# Patient Record
Sex: FEMALE | Race: WHITE | NOT HISPANIC OR LATINO | Employment: FULL TIME | URBAN - METROPOLITAN AREA
[De-identification: names, ages, dates, MRNs, and addresses within clinical notes are randomized per-mention and may not be internally consistent; named-entity substitution may affect disease eponyms.]

---

## 2019-01-17 ENCOUNTER — HISTORICAL (OUTPATIENT)
Dept: ADMINISTRATIVE | Facility: HOSPITAL | Age: 29
End: 2019-01-17

## 2019-01-17 LAB
ABS NEUT (OLG): 4.9 X10(3)/MCL (ref 2.1–9.2)
ALBUMIN SERPL-MCNC: 4.6 GM/DL (ref 3.4–5)
ALBUMIN/GLOB SERPL: 1.92 {RATIO} (ref 1.5–2.5)
ALP SERPL-CCNC: 57 UNIT/L (ref 38–126)
ALT SERPL-CCNC: 18 UNIT/L (ref 7–52)
AST SERPL-CCNC: 18 UNIT/L (ref 15–37)
BILIRUB SERPL-MCNC: 0.6 MG/DL (ref 0.2–1)
BILIRUBIN DIRECT+TOT PNL SERPL-MCNC: 0.1 MG/DL (ref 0–0.5)
BILIRUBIN DIRECT+TOT PNL SERPL-MCNC: 0.5 MG/DL
BUN SERPL-MCNC: 8 MG/DL (ref 7–18)
CALCIUM SERPL-MCNC: 9.2 MG/DL (ref 8.5–10)
CHLORIDE SERPL-SCNC: 98 MMOL/L (ref 98–107)
CHOLEST SERPL-MCNC: 230 MG/DL (ref 0–200)
CHOLEST/HDLC SERPL: 4 {RATIO}
CO2 SERPL-SCNC: 23 MMOL/L (ref 21–32)
CREAT SERPL-MCNC: 0.77 MG/DL (ref 0.6–1.3)
ERYTHROCYTE [DISTWIDTH] IN BLOOD BY AUTOMATED COUNT: 12.4 % (ref 11.5–17)
GLOBULIN SER-MCNC: 2.4 GM/DL (ref 1.2–3)
GLUCOSE SERPL-MCNC: 83 MG/DL (ref 74–106)
HCT VFR BLD AUTO: 42.6 % (ref 37–47)
HDLC SERPL-MCNC: 57 MG/DL (ref 35–60)
HGB BLD-MCNC: 13.6 GM/DL (ref 12–16)
LDLC SERPL CALC-MCNC: 142 MG/DL (ref 0–129)
LYMPHOCYTES # BLD AUTO: 2.6 X10(3)/MCL (ref 0.6–3.4)
LYMPHOCYTES NFR BLD AUTO: 33 % (ref 13–40)
MCH RBC QN AUTO: 31.1 PG (ref 27–31.2)
MCHC RBC AUTO-ENTMCNC: 32 GM/DL (ref 32–36)
MCV RBC AUTO: 97 FL (ref 80–94)
MONOCYTES # BLD AUTO: 0.3 X10(3)/MCL (ref 0.1–1.3)
MONOCYTES NFR BLD AUTO: 3.6 % (ref 0.1–24)
NEUTROPHILS NFR BLD AUTO: 63.4 % (ref 47–80)
PLATELET # BLD AUTO: 342 X10(3)/MCL (ref 130–400)
PMV BLD AUTO: 7.5 FL (ref 9.4–12.4)
POTASSIUM SERPL-SCNC: 3.9 MMOL/L (ref 3.5–5.1)
PROT SERPL-MCNC: 7 GM/DL (ref 6.4–8.2)
RBC # BLD AUTO: 4.38 X10(6)/MCL (ref 4.2–5.4)
SODIUM SERPL-SCNC: 131 MMOL/L (ref 136–145)
TRIGL SERPL-MCNC: 151 MG/DL (ref 30–150)
TSH SERPL-ACNC: 1.16 MIU/ML (ref 0.35–4.94)
VLDLC SERPL CALC-MCNC: 30.2 MG/DL
WBC # SPEC AUTO: 7.8 X10(3)/MCL (ref 4.5–11.5)

## 2020-10-15 ENCOUNTER — HISTORICAL (OUTPATIENT)
Dept: ADMINISTRATIVE | Facility: HOSPITAL | Age: 30
End: 2020-10-15

## 2020-10-15 LAB
ABS NEUT (OLG): 3.7 X10(3)/MCL (ref 2.1–9.2)
ALBUMIN SERPL-MCNC: 4.7 GM/DL (ref 3.4–5)
ALBUMIN/GLOB SERPL: 1.81 {RATIO} (ref 1.5–2.5)
ALP SERPL-CCNC: 53 UNIT/L (ref 38–126)
ALT SERPL-CCNC: 17 UNIT/L (ref 7–52)
AST SERPL-CCNC: 19 UNIT/L (ref 15–37)
BILIRUB SERPL-MCNC: 0.6 MG/DL (ref 0.2–1)
BILIRUBIN DIRECT+TOT PNL SERPL-MCNC: 0.1 MG/DL (ref 0–0.5)
BILIRUBIN DIRECT+TOT PNL SERPL-MCNC: 0.5 MG/DL
BUN SERPL-MCNC: 11 MG/DL (ref 7–18)
CALCIUM SERPL-MCNC: 9.7 MG/DL (ref 8.5–10)
CHLORIDE SERPL-SCNC: 101 MMOL/L (ref 98–107)
CHOLEST SERPL-MCNC: 244 MG/DL (ref 0–200)
CHOLEST/HDLC SERPL: 4.1 {RATIO}
CO2 SERPL-SCNC: 24 MMOL/L (ref 21–32)
CREAT SERPL-MCNC: 0.8 MG/DL (ref 0.6–1.3)
ERYTHROCYTE [DISTWIDTH] IN BLOOD BY AUTOMATED COUNT: 11.7 % (ref 11.5–17)
GLOBULIN SER-MCNC: 2.6 GM/DL (ref 1.2–3)
GLUCOSE SERPL-MCNC: 90 MG/DL (ref 74–106)
HCT VFR BLD AUTO: 39.8 % (ref 37–47)
HDLC SERPL-MCNC: 60 MG/DL (ref 35–60)
HGB BLD-MCNC: 13.2 GM/DL (ref 12–16)
LDLC SERPL CALC-MCNC: 172 MG/DL (ref 0–129)
LYMPHOCYTES # BLD AUTO: 1.8 X10(3)/MCL (ref 0.6–3.4)
LYMPHOCYTES NFR BLD AUTO: 29.1 % (ref 13–40)
MCH RBC QN AUTO: 30.8 PG (ref 27–31.2)
MCHC RBC AUTO-ENTMCNC: 33 GM/DL (ref 32–36)
MCV RBC AUTO: 93 FL (ref 80–94)
MONOCYTES # BLD AUTO: 0.6 X10(3)/MCL (ref 0.1–1.3)
MONOCYTES NFR BLD AUTO: 10.2 % (ref 0.1–24)
NEUTROPHILS NFR BLD AUTO: 60.7 % (ref 47–80)
PLATELET # BLD AUTO: 338 X10(3)/MCL (ref 130–400)
PMV BLD AUTO: 7.8 FL (ref 9.4–12.4)
POTASSIUM SERPL-SCNC: 4 MMOL/L (ref 3.5–5.1)
PROT SERPL-MCNC: 7.3 GM/DL (ref 6.4–8.2)
RBC # BLD AUTO: 4.28 X10(6)/MCL (ref 4.2–5.4)
SODIUM SERPL-SCNC: 136 MMOL/L (ref 136–145)
TRIGL SERPL-MCNC: 130 MG/DL (ref 30–150)
TSH SERPL-ACNC: 1.18 MIU/ML (ref 0.35–4.94)
VLDLC SERPL CALC-MCNC: 26 MG/DL
WBC # SPEC AUTO: 6.1 X10(3)/MCL (ref 4.5–11.5)

## 2020-10-31 LAB
INFLUENZA A ANTIGEN, POC: NEGATIVE
INFLUENZA B ANTIGEN, POC: NEGATIVE
RAPID GROUP A STREP (OHS): NEGATIVE

## 2022-01-14 ENCOUNTER — HISTORICAL (OUTPATIENT)
Dept: ADMINISTRATIVE | Facility: HOSPITAL | Age: 32
End: 2022-01-14

## 2022-01-14 LAB
CHOLEST SERPL-MCNC: 264 MG/DL (ref 0–200)
CHOLEST/HDLC SERPL: 3.1 {RATIO}
HDLC SERPL-MCNC: 86 MG/DL (ref 35–60)
LDLC SERPL CALC-MCNC: 167 MG/DL (ref 0–129)
TRIGL SERPL-MCNC: 112 MG/DL (ref 30–150)
VLDLC SERPL CALC-MCNC: 22.4 MG/DL

## 2022-05-03 NOTE — HISTORICAL [facility]ERNER
This is a historical note converted from [employer]. Formatting and pictures may have been removed.  Please reference [employer] for original formatting and attached multimedia. Chief Complaint  WELLNESS CPX, RECHECK ADHD MEDS  History of Present Illness  28 year old WF presents fasting for annual wellness  PMH: ADHD, Anxiety, GERD  ?  Single, no kids, Works as teacher  Tob: 2-3 cig/day ?EtOH: 3-4 drinks/week  Exercise: cardio/wts/yoga few times/week  ?  GYN (Dr De La Cruz)- PAP UTD  Review of Systems  Constitutional:?no fever, fatigue, weakness  Eye:?no vision loss, eye redness, drainage, or pain  ENMT:?no sore throat, ear pain, sinus pain/congestion, nasal congestion/drainage  Respiratory:?no cough, no wheezing, no shortness of breath  Cardiovascular:?no chest pain, no palpitations, no edema  Gastrointestinal:?no nausea, vomiting, or diarrhea. No abdominal pain  Genitourinary:?no dysuria, no urinary frequency or urgency, no hematuria  Hema/Lymph:?no abnormal bruising or bleeding  Endocrine:?no heat or cold intolerance, no excessive thirst or excessive urination  Musculoskeletal:?no muscle or joint pain, no joint swelling  Integumentary:?no skin rash or abnormal lesion  Neurologic: no headache, no dizziness, no weakness or numbness  ?  Physical Exam  Vitals & Measurements  T:?37.1? ?C (Oral)? HR:?76(Peripheral)? BP:?138/88?  HT:?165?cm? HT:?165?cm? WT:?67.4?kg? WT:?67.4?kg? BMI:?24.76?  General:?well-developed well-nourished in no acute distress  Eye: PERRLA, EOMI, clear conjunctiva, eyelids normal  HENT:?TMs/ear canals clear, oropharynx without erythema/exudate, oropharynx and nasal mucosal surfaces moist, no maxillary/frontal sinus tenderness to palpation  Neck: full range of motion, no thyromegaly or lymphadenopathy  Respiratory:?clear to auscultation bilaterally  Cardiovascular:?regular rate and rhythm without murmurs, gallops or rubs  Gastrointestinal:?soft, non-tender, non-distended with normal bowel sounds, without  masses to palpation  Genitourinary: no CVA tenderness to palpation  Musculoskeletal:?full range of motion of all extremities/spine without limitation or discomfort  Integumentary: no rashes or skin lesions present  Neurologic: cranial nerves intact, no signs of peripheral neurological deficit, motor/sensory function intact  ?  Assessment/Plan  1.?Wellness examination?Z00.00  ?LABS: CBC, CMP, TSH, FLP  Ordered:  CBC w/ Auto Diff, Routine collect, 01/17/19 16:06:00 CST, Blood, Order for future visit, Stop date 01/17/19 16:06:00 CST, Lab Collect, Wellness examination, 01/17/19 16:06:00 CST  Comprehensive Metabolic Panel, Now collect, 01/17/19 16:06:00 CST, Blood, Order for future visit, Stop date 01/17/19 16:06:00 CST, Lab Collect, Wellness examination, 01/17/19 16:06:00 CST  Lab Collection Request, 01/17/19 16:06:00 CST, [facility], 01/17/19 16:06:00 CST  Lipid Panel, Routine collect, 01/17/19 16:06:00 CST, Blood, Order for future visit, Stop date 01/17/19 16:06:00 CST, Lab Collect, Wellness examination, 01/17/19 16:06:00 CST  Preventative Health Care Est 18-39 years [id number] PC, Wellness examination, [facility] - [facility], 01/17/19 16:06:00 CST  Thyroid Stimulating Hormone, Routine collect, 01/17/19 16:06:00 CST, Blood, Order for future visit, Stop date 01/17/19 16:06:00 CST, Lab Collect, Wellness examination, 01/17/19 16:06:00 CST  ?  2.?Chronic GERD?K21.9  Diet controlled  ?  3.?ADHD?F90.9  Continue Adderall XR 20mg PO q day (30, NR)  ??????? Rx x 2? Just filled Rx on 1/14/19  ?  4.?Anxiety?F41.9  ?Continue Celexa 20mg PO q day  ?   Problem List/Past Medical History  Ongoing  Anxiety  Asthma  Chronic GERD  Depression  Ocular migraine  Tobacco user  Historical  No qualifying data  Procedure/Surgical History  Esophagogastroduodenoscopy  Eustachian tuboplasty   Medications  Adderall 10 mg oral tablet, 10 mg= 1 tab(s), Oral, NOON  Adderall XR 20 mg oral capsule, extended release, 20 mg= 1 cap(s), Oral, qAM  Adderall XR 20  mg oral capsule, extended release, 20 mg= 1 cap(s), Oral, qAM  Adderall XR 20 mg oral capsule, extended release, 20 mg= 1 cap(s), Oral, qAM  AMOX-CLAV 875MG TABLETS, 1 tab(s), Oral, BID  citalopram 20 mg oral tablet, See Instructions, 5 refills  LORazepam 1 mg oral tablet, 1 mg= 1 tab(s), Oral, q8hr, 2 refills  NUVARING VAGINAL RING, 1 EA, VAG, qMonth  PROMETHAZINE DM SYRUP, 5 mL, Oral, q6hr  Allergies  No Known Medication Allergies  Social History  Alcohol  Current, 1-2 times per week, 04/16/2018  Employment/School  Employed, 04/16/2018  Home/Environment  Lives with Significant other., 04/16/2018  Nutrition/Health  Regular, 04/16/2018  Substance Abuse  Past, 04/16/2018  Tobacco  Smoker, current status unknown, No, 01/17/2019  Current every day smoker, Cigarettes, 2 per day. 8 year(s)., 04/16/2018  Family History  Diabetes mellitus type 2: Mother.  Hypertension.: Mother.  Immunizations  Vaccine Date Status   meningococcal conjugate vaccine 05/01/2007 Recorded   measles/mumps/rubella virus vaccine 07/12/1994 Recorded   measles/mumps/rubella virus vaccine 03/01/1990 Recorded   Health Maintenance  Health Maintenance  ???Pending?(in the next year)  ??? ??OverDue  ??? ? ? ?Smoking Cessation due??01/12/19??and every 180??day(s)  ??? ??Due?  ??? ? ? ?ADL Screening due??01/17/19??and every 1??year(s)  ??? ? ? ?Alcohol Misuse Screening due??01/17/19??and every 1??year(s)  ??? ? ? ?Asthma Management-Written Action Plan due??01/17/19??and every 6??month(s)  ??? ? ? ?Asthma Management-Vargas Peak Flow due??01/17/19??Variable frequency  ??? ? ? ?Asthma Management-Spirometry due??01/17/19??Variable frequency  ??? ? ? ?Asthma Management-Asthma Education due??01/17/19??and every 6??month(s)  ??? ? ? ?Tetanus Vaccine due??01/17/19??and every 10??year(s)  ???Satisfied?(in the past 1 year)  ??? ??Satisfied?  ??? ? ? ?Blood Pressure Screening on??01/17/19.??Satisfied by Fani Davis LPN  ??? ? ? ?Body Mass Index Check  on??01/17/19.??Satisfied by Fani Davis LPN  ??? ? ? ?Cervical Cancer Screening on??10/16/18.??Satisfied by Mary Piedra  ??? ? ? ?Depression Screening on??01/17/19.??Satisfied by Fani Davis LPN  ??? ? ? ?Influenza Vaccine on??01/17/19.??Satisfied by Fani Davis LPN  ??? ? ? ?Obesity Screening on??01/17/19.??Satisfied by Fani Davis LPN  ??? ? ? ?Smoking Cessation on??07/16/18.??Satisfied by Fani Davis LPN  ?  ?

## 2022-05-03 NOTE — HISTORICAL [facility]
This is a historical note converted from [employer]. Formatting and pictures may have been removed.  Please reference [employer] for original formatting and attached multimedia. Chief Complaint  WELLNESS CPX 6 HR FAST, MEDS  Physical Exam  Vitals & Measurements  T:?37.2? ?C (Oral)? HR:?68(Peripheral)? BP:?128/86?  HT:?165.00?cm? WT:?67.800?kg? BMI:?24.9? LMP:?10/02/2020 00:00 CDT?  Assessment/Plan  1.?Encounter for wellness examination?Z00.00  Ordered:  Automated Diff, Routine collect, 10/15/20 16:06:00 CDT, Blood, Collected, Stop date 10/15/20 16:06:00 CDT, Lab Collect, Encounter for wellness examination  Adult ADHD  Depression  Anxiety, 10/15/20 16:06:00 CDT  CBC w/ Auto Diff, Routine collect, 10/15/20 16:06:00 CDT, Blood, Stop date 10/15/20 16:06:00 CDT, Lab Collect, Encounter for wellness examination  Adult ADHD  Depression  Anxiety, 10/15/20 16:06:00 CDT  Comprehensive Metabolic Panel, Routine collect, 10/15/20 16:06:00 CDT, Blood, Stop date 10/15/20 16:06:00 CDT, Lab Collect, Encounter for wellness examination  Adult ADHD  Depression  Anxiety, 10/15/20 16:06:00 CDT  Lab Collection Request, 10/15/20 15:56:00 CDT, [facility], 10/15/20 15:56:00 CDT, Encounter for wellness examination  Adult ADHD  Depression  Anxiety  Lipid Panel, Routine collect, 10/15/20 16:06:00 CDT, Blood, Stop date 10/15/20 16:06:00 CDT, Lab Collect, Encounter for wellness examination  Adult ADHD  Depression  Anxiety, 10/15/20 16:06:00 CDT  Office/Outpatient Visit Level 3 Established [id number] PC, Encounter for wellness examination  Adult ADHD  Depression  Anxiety, [facility] - [facility], 10/15/20 15:56:00 CDT  Preventative Health Care Est 18-39 years [id number] PC, Encounter for wellness examination  Adult ADHD  Depression  Anxiety, [facility] - [facility], 10/15/20 15:56:00 CDT  Thyroid Stimulating Hormone, Routine collect, 10/15/20 16:06:00 CDT, Blood, Stop date 10/15/20 16:06:00 CDT, Lab Collect, Encounter for wellness examination  Adult  ADHD  Depression  Anxiety  Fatigue, 10/15/20 16:06:00 CDT  ?  2.?Adult ADHD?F90.9  Ordered:  Automated Diff, Routine collect, 10/15/20 16:06:00 CDT, Blood, Collected, Stop date 10/15/20 16:06:00 CDT, Lab Collect, Encounter for wellness examination  Adult ADHD  Depression  Anxiety, 10/15/20 16:06:00 CDT  CBC w/ Auto Diff, Routine collect, 10/15/20 16:06:00 CDT, Blood, Stop date 10/15/20 16:06:00 CDT, Lab Collect, Encounter for wellness examination  Adult ADHD  Depression  Anxiety, 10/15/20 16:06:00 CDT  Clinic Follow up, *Est. 01/15/21 3:00:00 CST, Order for future visit, Adult ADHD  Depression  Anxiety, [facility]  Comprehensive Metabolic Panel, Routine collect, 10/15/20 16:06:00 CDT, Blood, Stop date 10/15/20 16:06:00 CDT, Lab Collect, Encounter for wellness examination  Adult ADHD  Depression  Anxiety, 10/15/20 16:06:00 CDT  Lab Collection Request, 10/15/20 15:56:00 CDT, [facility], 10/15/20 15:56:00 CDT, Encounter for wellness examination  Adult ADHD  Depression  Anxiety  Lipid Panel, Routine collect, 10/15/20 16:06:00 CDT, Blood, Stop date 10/15/20 16:06:00 CDT, Lab Collect, Encounter for wellness examination  Adult ADHD  Depression  Anxiety, 10/15/20 16:06:00 CDT  Office/Outpatient Visit Level 3 Established [id number] PC, Encounter for wellness examination  Adult ADHD  Depression  Anxiety, [facility], 10/15/20 15:56:00 CDT  Preventative Health Care Est 18-39 years [id number] PC, Encounter for wellness examination  Adult ADHD  Depression  Anxiety, [facility], 10/15/20 15:56:00 CDT  Thyroid Stimulating Hormone, Routine collect, 10/15/20 16:06:00 CDT, Blood, Stop date 10/15/20 16:06:00 CDT, Lab Collect, Encounter for wellness examination  Adult ADHD  Depression  Anxiety  Fatigue, 10/15/20 16:06:00 CDT  ?  3.?Depression?F32.9  Ordered:  Automated Diff, Routine collect, 10/15/20 16:06:00 CDT, Blood, Collected, Stop date 10/15/20 16:06:00 CDT, Lab Collect, Encounter for wellness  examination  Adult ADHD  Depression  Anxiety, 10/15/20 16:06:00 CDT  CBC w/ Auto Diff, Routine collect, 10/15/20 16:06:00 CDT, Blood, Stop date 10/15/20 16:06:00 CDT, Lab Collect, Encounter for wellness examination  Adult ADHD  Depression  Anxiety, 10/15/20 16:06:00 CDT  Clinic Follow up, *Est. 01/15/21 3:00:00 CST, Order for future visit, Adult ADHD  Depression  Anxiety, [facility]  Comprehensive Metabolic Panel, Routine collect, 10/15/20 16:06:00 CDT, Blood, Stop date 10/15/20 16:06:00 CDT, Lab Collect, Encounter for wellness examination  Adult ADHD  Depression  Anxiety, 10/15/20 16:06:00 CDT  Lab Collection Request, 10/15/20 15:56:00 CDT, [facility], 10/15/20 15:56:00 CDT, Encounter for wellness examination  Adult ADHD  Depression  Anxiety  Lipid Panel, Routine collect, 10/15/20 16:06:00 CDT, Blood, Stop date 10/15/20 16:06:00 CDT, Lab Collect, Encounter for wellness examination  Adult ADHD  Depression  Anxiety, 10/15/20 16:06:00 CDT  Office/Outpatient Visit Level 3 Established [id number] PC, Encounter for wellness examination  Adult ADHD  Depression  Anxiety, [facility], 10/15/20 15:56:00 CDT  Preventative Health Care Est 18-39 years [id number] PC, Encounter for wellness examination  Adult ADHD  Depression  Anxiety, [facility], 10/15/20 15:56:00 CDT  Thyroid Stimulating Hormone, Routine collect, 10/15/20 16:06:00 CDT, Blood, Stop date 10/15/20 16:06:00 CDT, Lab Collect, Encounter for wellness examination  Adult ADHD  Depression  Anxiety  Fatigue, 10/15/20 16:06:00 CDT  ?  4.?Anxiety?F41.9  Ordered:  Automated Diff, Routine collect, 10/15/20 16:06:00 CDT, Blood, Collected, Stop date 10/15/20 16:06:00 CDT, Lab Collect, Encounter for wellness examination  Adult ADHD  Depression  Anxiety, 10/15/20 16:06:00 CDT  CBC w/ Auto Diff, Routine collect, 10/15/20 16:06:00 CDT, Blood, Stop date 10/15/20 16:06:00 CDT, Lab Collect, Encounter for wellness examination  Adult ADHD  Depression   Anxiety, 10/15/20 16:06:00 CDT  Clinic Follow up, *Est. 01/15/21 3:00:00 CST, Order for future visit, Adult ADHD  Depression  Anxiety, [facility]  Comprehensive Metabolic Panel, Routine collect, 10/15/20 16:06:00 CDT, Blood, Stop date 10/15/20 16:06:00 CDT, Lab Collect, Encounter for wellness examination  Adult ADHD  Depression  Anxiety, 10/15/20 16:06:00 CDT  Lab Collection Request, 10/15/20 15:56:00 CDT, [facility] - [facility], 10/15/20 15:56:00 CDT, Encounter for wellness examination  Adult ADHD  Depression  Anxiety  Lipid Panel, Routine collect, 10/15/20 16:06:00 CDT, Blood, Stop date 10/15/20 16:06:00 CDT, Lab Collect, Encounter for wellness examination  Adult ADHD  Depression  Anxiety, 10/15/20 16:06:00 CDT  Office/Outpatient Visit Level 3 Established [id number] PC, Encounter for wellness examination  Adult ADHD  Depression  Anxiety, [facility], 10/15/20 15:56:00 CDT  Preventative Health Care Est 18-39 years 19476 PC, Encounter for wellness examination  Adult ADHD  Depression  Anxiety, [facility], 10/15/20 15:56:00 CDT  Thyroid Stimulating Hormone, Routine collect, 10/15/20 16:06:00 CDT, Blood, Stop date 10/15/20 16:06:00 CDT, Lab Collect, Encounter for wellness examination  Adult ADHD  Depression  Anxiety  Fatigue, 10/15/20 16:06:00 CDT  ?  Orders:  amphetamine-dextroamphetamine, 10 mg = 1 tab(s), Oral, BID, # 60 tab(s), 0 Refill(s), Earliest Fill Date 11/15/20  amphetamine-dextroamphetamine, 20 mg = 1 cap(s), Oral, qAM, # 30 cap(s), 0 Refill(s), Earliest Fill Date 11/15/20  amphetamine-dextroamphetamine, 10 mg = 1 tab(s), Oral, qAM, # 30 tab(s), 0 Refill(s)  amphetamine-dextroamphetamine, 20 mg = 1 cap(s), Oral, qAM, # 30 cap(s), 0 Refill(s)  amphetamine-dextroamphetamine, 20 mg = 1 cap(s), Oral, qAM, # 30 cap(s), 0 Refill(s), Earliest Fill Date 12/15/20  amphetamine-dextroamphetamine, 10 mg = 1 tab(s), Oral, BID, # 60 tab(s), 0 Refill(s), Earliest Fill Date 12/15/20  LORAzepam, 1 mg = 1  tab(s), Oral, q8hr, # 30 tab(s), 0 Refill(s), Pharmacy: [id number], 165, cm, Height/Length Dosing, 10/15/20 15:53:00 CDT, 67.8, kg, Weight Dosing, 10/15/20 15:53:00 CDT  Referrals  Clinic Follow up, *Est. 01/15/21 3:00:00 CST, Order for future visit, Adult ADHD  Depression  Anxiety, [facility]   Problem List/Past Medical History  Ongoing  Adult ADHD  Anxiety  Asthma  Chronic GERD  Depression  Ocular migraine  Tobacco user  Historical  No qualifying data  Procedure/Surgical History  Esophagogastroduodenoscopy  Eustachian tuboplasty   Medications  Adderall 10 mg oral tablet, 10 mg= 1 tab(s), Oral, qAM  Adderall 10 mg oral tablet, 10 mg= 1 tab(s), Oral, BID  Adderall 10 mg oral tablet, 10 mg= 1 tab(s), Oral, BID  Adderall XR 20 mg oral capsule, extended release, 20 mg= 1 cap(s), Oral, qAM  Adderall XR 20 mg oral capsule, extended release, 20 mg= 1 cap(s), Oral, qAM  Adderall XR 20 mg oral capsule, extended release, 20 mg= 1 cap(s), Oral, qAM  LORazepam 1 mg oral tablet, 1 mg= 1 tab(s), Oral, q8hr  NUVARING VAGINAL RING, 1 EA, VAG, qMonth  Pristiq 50 mg oral tablet, extended release, 50 mg= 1 tab(s), Oral, Daily, 5 refills  Allergies  No Known Medication Allergies  Social History  Abuse/Neglect  No, 01/07/2020  Alcohol  Current, 1-2 times per week, 04/16/2018  Employment/School  Employed, 04/16/2018  Home/Environment  Lives with Significant other., 04/16/2018  Nutrition/Health  Regular, 04/16/2018  Substance Use  Past, 04/16/2018  Tobacco  Smoker, current status unknown, N/A, 01/07/2020  Current every day smoker, Cigarettes, 2 per day. 8 year(s)., 04/16/2018  Family History  Diabetes mellitus type 2: Mother.  Hypertension.: Mother.  Immunizations  Vaccine Date Status   tetanus/diphtheria/pertussis, acel(Tdap) 04/15/2019 Given   meningococcal conjugate vaccine 05/01/2007 Recorded   poliovirus vaccine, live, trivalent 07/12/1994 Recorded   measles/mumps/rubella virus vaccine 07/12/1994 Recorded   poliovirus  vaccine, live, trivalent 03/01/1990 Recorded   measles/mumps/rubella virus vaccine 03/01/1990 Recorded   poliovirus vaccine, live, trivalent 04/10/1989 Recorded   poliovirus vaccine, live, trivalent 01/26/1989 Recorded   Health Maintenance  Health Maintenance  ???Pending?(in the next year)  ??? ??OverDue  ??? ? ? ?Influenza Vaccine due??10/01/19??and every 1??day(s)  ??? ? ? ?Smoking Cessation due??01/01/20??and every 1??year(s)  ??? ??Due?  ??? ? ? ?ADL Screening due??10/15/20??and every 1??year(s)  ??? ? ? ?Asthma Management-Asthma Education due??10/15/20??and every 6??month(s)  ??? ? ? ?Asthma Management-Spirometry due??10/15/20??Variable frequency  ??? ? ? ?Asthma Management-Written Action Plan due??10/15/20??and every 6??month(s)  ??? ? ? ?Asthma Management-Vargas Peak Flow due??10/15/20??Variable frequency  ??? ??Due In Future?  ??? ? ? ?Obesity Screening not due until??01/01/21??and every 1??year(s)  ??? ? ? ?Alcohol Misuse Screening not due until??01/02/21??and every 1??year(s)  ???Satisfied?(in the past 1 year)  ??? ??Satisfied?  ??? ? ? ?Alcohol Misuse Screening on??10/15/20.??Satisfied by Fani Davis LPN  ??? ? ? ?Blood Pressure Screening on??10/15/20.??Satisfied by Fani Davis LPN  ??? ? ? ?Body Mass Index Check on??10/15/20.??Satisfied by Fani Davis LPN  ??? ? ? ?Cervical Cancer Screening on??11/26/19.??Satisfied by Idania Rodney  ??? ? ? ?Depression Screening on??10/15/20.??Satisfied by Fani Davis LPN  ??? ? ? ?Influenza Vaccine on??10/15/20.??Satisfied by Fani Davis LPN  ??? ? ? ?Obesity Screening on??10/15/20.??Satisfied by Fani Davis LPN  ?      Patient condition discussed?in detail with nurse practitioner.? Agree with plan of care?and follow-up.

## 2022-05-03 NOTE — HISTORICAL [facility] CERNER
This is a historical note converted from [employer]. Formatting and pictures may have been removed.  Please reference Cergabriel for original formatting and attached multimedia. Chief Complaint  WELLNESS CPX 6 HR FAST,[facility] MEDS  History of Present Illness  Here for annual wellness and ADHD follow up.? 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  ?  Doing well.? No acute complaints. Feels Pristiq is working well, without SE.? Not needing lorazepam as often.? Tolerates Adderall without issue.  ?  PMH: ADHD, depression/ anxiety  ?  GYN_ Dr. De La Cruz (DUE for PAP); No prev. MMG.  Review of Systems  Constitutional:?no fever, fatigue, weakness  Eye:?no vision loss, eye redness, drainage, or pain  ENMT:?no sore throat, ear pain, sinus pain/congestion  Respiratory:?no cough, no wheezing, no shortness of breath  Cardiovascular:?no chest pain, no palpitations, no edema  Gastrointestinal:?no nausea, vomiting, or diarrhea. No abdominal pain  Genitourinary:?no dysuria, no urinary frequency or urgency, no hematuria  Hema/Lymph:?no abnormal bruising or bleeding  Endocrine:?no heat or cold intolerance, no excessive thirst or excessive urination  Musculoskeletal:?no muscle or joint pain, no joint swelling  Integumentary:?no skin rash or abnormal lesion  Neurologic: no headache, no dizziness, no weakness or numbness  ?  Physical Exam  Vitals & Measurements  T:?37.2? ?C (Oral)? HR:?68(Peripheral)? BP:?128/86?  HT:?165.00?cm? WT:?67.800?kg? BMI:?24.9? LMP:?10/02/2020 00:00 CDT?  General:?well-developed well-nourished in no acute distress  Eye: PERRLA, EOMI, clear conjunctiva, eyelids normal  HENT:?TMs/ear canals clear, oropharynx without erythema/exudate, oropharynx and nasal mucosal surfaces moist, no maxillary/frontal sinus tenderness to palpation  Neck: full range of motion, no thyromegaly or lymphadenopathy  Respiratory:?clear to auscultation bilaterally  Cardiovascular:?regular rate and rhythm without murmurs, gallops or  rubs  Gastrointestinal:?soft, non-tender, non-distended with normal bowel sounds, without masses to palpation  Genitourinary: no CVA tenderness to palpation  Musculoskeletal:?full range of motion of all extremities/spine without limitation or discomfort  Integumentary: no rashes or skin lesions present  Neurologic: cranial nerves intact, no signs of peripheral neurological deficit, motor/sensory function intact  ?  Assessment/Plan  1.?Encounter for wellness examination?Z00.00  ?Wellness labs completed.? Will call patient with results.  Ordered:  Office/Outpatient Visit Level 3 Established 03072 , Encounter for wellness examination  Adult ADHD  Depression  Anxiety, [facility], 10/15/20 15:56:00 CDT  Preventative Health Care Est 18-39 years [address]357 PC, Encounter for wellness examination  Adult ADHD  Depression  Anxiety, [facility], 10/15/20 15:56:00 CDT  ?  2.?Adult ADHD?F90.9  ?Refill Adderall XR 20mg/day and Adderall 10mg at noon  Ordered:  Clinic Follow up, *Est. 01/11/21 16:00:00 CST, Order for future visit, Depression, [facility]  Office/Outpatient Visit Level 3 Established 45898 , Encounter for wellness examination  Adult ADHD  Depression  Anxiety, [facility], 10/15/20 15:56:00 CDT  [facility] Health Care Est 18-39 years [address] , Encounter for wellness examination  Adult ADHD  Depression  Anxiety, [facility], 10/15/20 15:56:00 CDT  ?  3.?Depression?F32.9  ?continue Pristiq 50mg/day  Ordered:  Clinic Follow up, *Est. 01/11/21 16:00:00 CST, Order for future visit, Depression, [facility]  Office/Outpatient Visit Level 3 Established 02959 , Encounter for wellness examination  Adult ADHD  Depression  Anxiety, [facility], 10/15/20 15:56:00 CDT  Preventative Health Care Est 18-39 years [address] , Encounter for wellness examination  Adult ADHD  Depression  Anxiety, [facility], 10/15/20 15:56:00 CDT  ?  4.?Anxiety?F41.9  ?Continue lorazepam 1mg PRN  Ordered:  Clinic  Follow up, *Est. 01/11/21 16:00:00 CST, Order for future visit, Depression, [facility]  Office/Outpatient Visit Level 3 Established 15610 PC, Encounter for wellness examination  Adult ADHD  Depression  Anxiety, [facility], 10/15/20 15:56:00 CDT  Preventative Health Care Est 18-39 years [address] PC, Encounter for wellness examination  Adult ADHD  Depression  Anxiety, [facility], 10/15/20 15:56:00 CDT  ?  Orders:  amphetamine-dextroamphetamine, 10 mg = 1 tab(s), Oral, BID, # 60 tab(s), 0 Refill(s), Earliest Fill Date 11/15/20  amphetamine-dextroamphetamine, 20 mg = 1 cap(s), Oral, qAM, # 30 cap(s), 0 Refill(s), Earliest Fill Date 11/15/20  amphetamine-dextroamphetamine, 10 mg = 1 tab(s), Oral, qAM, # 30 tab(s), 0 Refill(s)  amphetamine-dextroamphetamine, 20 mg = 1 cap(s), Oral, qAM, # 30 cap(s), 0 Refill(s)  amphetamine-dextroamphetamine, 20 mg = 1 cap(s), Oral, qAM, # 30 cap(s), 0 Refill(s), Earliest Fill Date 12/15/20  amphetamine-dextroamphetamine, 10 mg = 1 tab(s), Oral, BID, # 60 tab(s), 0 Refill(s), Earliest Fill Date 12/15/20  LORAzepam, 1 mg = 1 tab(s), Oral, q8hr, # 30 tab(s), 0 Refill(s), Pharmacy: [id number], 165, cm, Height/Length Dosing, 10/15/20 15:53:00 CDT, 67.8, kg, Weight Dosing, 10/15/20 15:53:00 CDT  1034F Current tobacco smoker:, 10/15/20 15:56:00 CDT  1111F- Medication reconciliation completed within 30 days of an inpatient discharge to home, 10/15/20 15:56:00 CDT  3008F Body Mass Index (BMI), documented, 10/15/20 15:56:00 CDT  3016F Patient screened for unhealthy alcohol use using a systematic screening method, 10/15/20 15:56:00 CDT  3074F Most recent systolic blood pressure, less than 130 mm Hg, 10/15/20 15:56:00 CDT  3079F Most recent diastolic blood pressure, 80 - 89 mm Hg, 10/15/20 15:56:00 CDT  3725F Screening for depression performed, 10/15/20 15:56:00 CDT  Referrals  Clinic Follow up, *Est. 01/11/21 16:00:00 CST, Order for future visit, Depression, [facility]    Problem List/Past Medical History  Ongoing  Adult ADHD  Anxiety  Asthma  Chronic GERD  Depression  Ocular migraine  Tobacco user  Historical  No qualifying data  Procedure/Surgical History  Esophagogastroduodenoscopy  Eustachian tuboplasty   Medications  Adderall 10 mg oral tablet, 10 mg= 1 tab(s), Oral, qAM  Adderall 10 mg oral tablet, 10 mg= 1 tab(s), Oral, BID  Adderall 10 mg oral tablet, 10 mg= 1 tab(s), Oral, BID  Adderall XR 20 mg oral capsule, extended release, 20 mg= 1 cap(s), Oral, qAM  Adderall XR 20 mg oral capsule, extended release, 20 mg= 1 cap(s), Oral, qAM  Adderall XR 20 mg oral capsule, extended release, 20 mg= 1 cap(s), Oral, qAM  LORazepam 1 mg oral tablet, 1 mg= 1 tab(s), Oral, q8hr  NUVARING VAGINAL RING, 1 EA, VAG, qMonth  Pristiq 50 mg oral tablet, extended release, 50 mg= 1 tab(s), Oral, Daily, 5 refills  Allergies  No Known Medication Allergies  Social History  Abuse/Neglect  No, 01/07/2020  Alcohol  Current, 1-2 times per week, 04/16/2018  Employment/School  Employed, 04/16/2018  Home/Environment  Lives with Significant other., 04/16/2018  Nutrition/Health  Regular, 04/16/2018  Substance Use  Past, 04/16/2018  Tobacco  Smoker, current status unknown, N/A, 01/07/2020  Current every day smoker, Cigarettes, 2 per day. 8 year(s)., 04/16/2018  Family History  Diabetes mellitus type 2: Mother.  Hypertension.: Mother.  Immunizations  Vaccine Date Status   tetanus/diphtheria/pertussis, acel(Tdap) 04/15/2019 Given   meningococcal conjugate vaccine 05/01/2007 Recorded   poliovirus vaccine, live, trivalent 07/12/1994 Recorded   measles/mumps/rubella virus vaccine 07/12/1994 Recorded   poliovirus vaccine, live, trivalent 03/01/1990 Recorded   measles/mumps/rubella virus vaccine 03/01/1990 Recorded   poliovirus vaccine, live, trivalent 04/10/1989 Recorded   poliovirus vaccine, live, trivalent 01/26/1989 Recorded   Health Maintenance  Health Maintenance  ???Pending?(in the next year)  ??? ??OverDue  ???  ? ? ?Influenza Vaccine due??10/01/19??and every 1??day(s)  ??? ? ? ?Smoking Cessation due??01/01/20??and every 1??year(s)  ??? ??Due?  ??? ? ? ?ADL Screening due??10/16/20??and every 1??year(s)  ??? ? ? ?Asthma Management-Asthma Education due??10/16/20??and every 6??month(s)  ??? ? ? ?Asthma Management-Spirometry due??10/16/20??Variable frequency  ??? ? ? ?Asthma Management-Written Action Plan due??10/16/20??and every 6??month(s)  ??? ? ? ?Asthma Management-Vargas Peak Flow due??10/16/20??Variable frequency  ??? ??Due In Future?  ??? ? ? ?Obesity Screening not due until??01/01/21??and every 1??year(s)  ??? ? ? ?Alcohol Misuse Screening not due until??01/02/21??and every 1??year(s)  ??? ? ? ?Blood Pressure Screening not due until??10/15/21??and every 1??year(s)  ??? ? ? ?Body Mass Index Check not due until??10/15/21??and every 1??year(s)  ???Satisfied?(in the past 1 year)  ??? ??Satisfied?  ??? ? ? ?Alcohol Misuse Screening on??10/15/20.??Satisfied by Fani Davis LPN  ??? ? ? ?Blood Pressure Screening on??10/15/20.??Satisfied by Fani Davis LPN  ??? ? ? ?Body Mass Index Check on??10/15/20.??Satisfied by Fani Davis LPN  ??? ? ? ?Cervical Cancer Screening on??11/26/19.??Satisfied by Idania Rodney  ??? ? ? ?Depression Screening on??10/15/20.??Satisfied by Fani Davis LPN  ??? ? ? ?Influenza Vaccine on??10/15/20.??Satisfied by Fani Davis LPN  ??? ? ? ?Obesity Screening on??10/15/20.??Satisfied by Fani Davis LPN  ?      Patient condition discussed?in detail with nurse practitioner.? Agree with plan of care?and follow-up.  ?

## 2022-07-06 PROBLEM — E78.5 HYPERLIPIDEMIA: Status: ACTIVE | Noted: 2022-07-06

## 2022-07-06 PROBLEM — F41.1 ANXIETY STATE: Status: ACTIVE | Noted: 2022-07-06

## 2022-07-06 PROBLEM — G43.109 OCULAR MIGRAINE: Status: ACTIVE | Noted: 2022-07-06

## 2022-07-06 PROBLEM — Z72.0 TOBACCO USER: Status: ACTIVE | Noted: 2022-07-06

## 2022-07-06 PROBLEM — K21.9 GASTROESOPHAGEAL REFLUX DISEASE: Status: ACTIVE | Noted: 2022-07-06

## 2022-07-06 PROBLEM — F90.9 ATTENTION DEFICIT DISORDER OF ADULT WITH HYPERACTIVITY: Status: ACTIVE | Noted: 2022-07-06

## 2022-07-06 PROBLEM — J45.909 ASTHMA: Status: ACTIVE | Noted: 2022-07-06

## 2022-07-06 PROBLEM — F32.A DEPRESSIVE DISORDER: Status: ACTIVE | Noted: 2022-07-06

## 2022-09-18 ENCOUNTER — HISTORICAL (OUTPATIENT)
Dept: ADMINISTRATIVE | Facility: HOSPITAL | Age: 32
End: 2022-09-18

## 2023-01-03 PROBLEM — Z78.9 USES BIRTH CONTROL: Status: ACTIVE | Noted: 2023-01-03

## 2023-04-10 PROCEDURE — 86225 DNA ANTIBODY NATIVE: CPT | Performed by: NURSE PRACTITIONER

## 2023-07-13 PROBLEM — Z00.00 ANNUAL PHYSICAL EXAM: Status: ACTIVE | Noted: 2023-07-13

## 2023-07-13 PROBLEM — F41.9 ANXIETY AND DEPRESSION: Status: ACTIVE | Noted: 2022-07-06

## 2023-07-13 PROBLEM — E78.2 MIXED HYPERLIPIDEMIA: Status: ACTIVE | Noted: 2022-07-06

## 2023-07-13 PROBLEM — E66.3 OVERWEIGHT WITH BODY MASS INDEX (BMI) OF 25 TO 25.9 IN ADULT: Status: ACTIVE | Noted: 2023-07-13

## 2023-07-13 PROBLEM — Z72.0 TOBACCO USER: Status: RESOLVED | Noted: 2022-07-06 | Resolved: 2023-07-13

## 2023-07-13 PROCEDURE — 87088 URINE BACTERIA CULTURE: CPT | Performed by: NURSE PRACTITIONER

## 2023-10-16 PROBLEM — Z00.00 ANNUAL PHYSICAL EXAM: Status: RESOLVED | Noted: 2023-07-13 | Resolved: 2023-10-16

## 2023-10-26 ENCOUNTER — LAB VISIT (OUTPATIENT)
Dept: LAB | Facility: HOSPITAL | Age: 33
End: 2023-10-26
Attending: OBSTETRICS & GYNECOLOGY
Payer: COMMERCIAL

## 2023-10-26 DIAGNOSIS — N91.0 MENSTRUATION DELAY: Primary | ICD-10-CM

## 2023-10-26 LAB — B-HCG FREE SERPL-ACNC: <2.42 MIU/ML

## 2023-10-26 PROCEDURE — 84702 CHORIONIC GONADOTROPIN TEST: CPT

## 2023-10-26 PROCEDURE — 36415 COLL VENOUS BLD VENIPUNCTURE: CPT

## 2023-12-14 ENCOUNTER — HOSPITAL ENCOUNTER (OUTPATIENT)
Dept: RADIOLOGY | Facility: HOSPITAL | Age: 33
Discharge: HOME | End: 2023-12-14
Attending: OBSTETRICS & GYNECOLOGY
Payer: COMMERCIAL

## 2023-12-14 DIAGNOSIS — Z12.31 ENCOUNTER FOR SCREENING MAMMOGRAM FOR BREAST CANCER: ICD-10-CM

## 2023-12-14 PROCEDURE — 77067 SCR MAMMO BI INCL CAD: CPT | Mod: 26,,, | Performed by: STUDENT IN AN ORGANIZED HEALTH CARE EDUCATION/TRAINING PROGRAM

## 2023-12-14 PROCEDURE — 77067 SCR MAMMO BI INCL CAD: CPT | Mod: TC

## 2023-12-14 PROCEDURE — 77063 BREAST TOMOSYNTHESIS BI: CPT | Mod: 26,,, | Performed by: STUDENT IN AN ORGANIZED HEALTH CARE EDUCATION/TRAINING PROGRAM

## 2023-12-14 PROCEDURE — 77063 MAMMO DIGITAL SCREENING BILAT WITH TOMO: ICD-10-PCS | Mod: 26,,, | Performed by: STUDENT IN AN ORGANIZED HEALTH CARE EDUCATION/TRAINING PROGRAM

## 2023-12-14 PROCEDURE — 77067 MAMMO DIGITAL SCREENING BILAT WITH TOMO: ICD-10-PCS | Mod: 26,,, | Performed by: STUDENT IN AN ORGANIZED HEALTH CARE EDUCATION/TRAINING PROGRAM

## 2024-06-18 PROCEDURE — 83970 ASSAY OF PARATHORMONE: CPT | Performed by: STUDENT IN AN ORGANIZED HEALTH CARE EDUCATION/TRAINING PROGRAM

## 2024-07-16 ENCOUNTER — OFFICE VISIT (OUTPATIENT)
Dept: SURGICAL ONCOLOGY | Facility: CLINIC | Age: 34
End: 2024-07-16
Payer: COMMERCIAL

## 2024-07-16 VITALS
HEIGHT: 65 IN | DIASTOLIC BLOOD PRESSURE: 73 MMHG | BODY MASS INDEX: 27.32 KG/M2 | SYSTOLIC BLOOD PRESSURE: 103 MMHG | HEART RATE: 76 BPM | WEIGHT: 164 LBS

## 2024-07-16 DIAGNOSIS — E04.1 THYROID NODULE: ICD-10-CM

## 2024-07-16 PROCEDURE — 3074F SYST BP LT 130 MM HG: CPT | Mod: CPTII,S$GLB,, | Performed by: OTOLARYNGOLOGY

## 2024-07-16 PROCEDURE — 1159F MED LIST DOCD IN RCRD: CPT | Mod: CPTII,S$GLB,, | Performed by: OTOLARYNGOLOGY

## 2024-07-16 PROCEDURE — 99204 OFFICE O/P NEW MOD 45 MIN: CPT | Mod: 25,S$GLB,, | Performed by: OTOLARYNGOLOGY

## 2024-07-16 PROCEDURE — 3008F BODY MASS INDEX DOCD: CPT | Mod: CPTII,S$GLB,, | Performed by: OTOLARYNGOLOGY

## 2024-07-16 PROCEDURE — 99999 PR PBB SHADOW E&M-EST. PATIENT-LVL III: CPT | Mod: PBBFAC,,, | Performed by: OTOLARYNGOLOGY

## 2024-07-16 PROCEDURE — 10021 FNA BX W/O IMG GDN 1ST LES: CPT | Mod: S$GLB,,, | Performed by: OTOLARYNGOLOGY

## 2024-07-16 PROCEDURE — 3078F DIAST BP <80 MM HG: CPT | Mod: CPTII,S$GLB,, | Performed by: OTOLARYNGOLOGY

## 2024-07-16 RX ORDER — METHOCARBAMOL 750 MG/1
1 TABLET, FILM COATED ORAL 3 TIMES DAILY
COMMUNITY

## 2024-07-16 NOTE — PROGRESS NOTES
[facility]  Head and Neck Surgical Oncology Clinic     Patient ID: [id number]     Chief Complaint: Thyroid Nodule      HPI:     Magaly Diaz is a 33 y.o. female here today for evaluation of a left-sided lower thyroid nodule meeting criteria for fine-needle aspiration.  No family history of thyroid malignancy or external beam radiation.  No other concerning head or neck complaints.    History reviewed. No pertinent past medical history.     Past Surgical History:   Procedure Laterality Date    ACDF       c4-c6    DILATION, EUSTACHIAN TUBE, BILATERAL, USING BALLOON      ESOPHAGOGASTRODUODENOSCOPY          Social History     Tobacco Use    Smoking status: Former     Current packs/day: 0.50     Types: Cigarettes    Smokeless tobacco: Never   Substance and Sexual Activity    Alcohol use: Not on file    Drug use: Not on file    Sexual activity: Not on file        Current Outpatient Medications   Medication Instructions    [START ON 7/30/2024] dextroamphetamine-amphetamine (ADDERALL XR) 25 MG 24 hr capsule 25 mg, Oral, Every morning    [START ON 7/30/2024] dextroamphetamine-amphetamine (ADDERALL) 10 mg Tab 10 mg, Oral, Daily PRN    etonogestreL-ethinyl estradioL (NUVARING) 0.12-0.015 mg/24 hr vaginal ring 1 each, Vaginal, Every 30 days    ketoconazole (NIZORAL) 1 g, Topical (Top), Every other day    LORazepam (ATIVAN) 1 mg, Oral, Every 8 hours PRN    methocarbamoL (ROBAXIN) 750 MG Tab 1 tablet, Oral, 3 times daily    pregabalin (LYRICA) 150 MG capsule        Review of patient's allergies indicates:  No Known Allergies     Patient Care Team:  Rory Tovar MD as PCP - General (Family Medicine)  Luis De La Cruz MD as Consulting Physician (Obstetrics and Gynecology)  Chepe Devine Jr., MD as Consulting Physician (Physical Medicine and Rehabilitation)     Subjective:     Review of Systems    12 point review of systems conducted, negative except as stated in the history of present illness. See  "[facility] for details.    Objective:     Visit Vitals  /73   Pulse 76   Ht 5' 5" (1.651 m)   Wt 74.4 kg (164 lb)   BMI 27.29 kg/m²       ENT Physical Exam     General: AOx3, NAD  Cardiac: Well perfused  Respiratory: Breathing without stridor or distress  Right Ear: External Auditory Canal WNL,TM w/o masses/lesions/perforations  Left Ear:  External Auditory Canal WNL,TM w/o masses/lesions/perforations  Nose: No gross nasal septal deviation.  Inferior Turbinates WNL bilaterally.  No septal perforation.  No masses/lesions.  Oral Cavity: FOM Soft, no masses palpated.  Oral Tongue mobile.  Hard Palate WNL.  Oropharynx: BOT WNL.  No masses/lesions noted.  Tonsillar fossa without lesions.  Soft palate without masses.  Midline uvula.  Neck: No palpable lymphadenopathy at I - VI.  Well-healed cervical incision just to the right of midline  Face: House Brackmann I bilaterally.  Eyes: Normal extra ocular motion bilaterally.    Fine needle Aspiration:  Fine needle aspiration was performed under ultrasound guidance.  She tolerated it well.  No signs of bleeding or hematoma.  It was sent for analysis via Afirma.    Diagnostic Imaging:     All pertinent head and neck imaging independently reviewed. Discussed these findings in detail with the patient.    Assessment:       ICD-10-CM ICD-9-CM   1. Thyroid nodule  E04.1 241.0        Plan:     1. Thyroid nodule  Overview:  1. Left lower 2.6 cm thyroid nodule meeting criteria for fine-needle aspiration on 07/16/2024    Assessment & Plan:  She tolerated the biopsy well today.  We will see her back in 2 weeks to go over the results and make a plan going forward    Orders:  -     Ambulatory referral/consult to ENT         No follow-ups on file. In addition to their scheduled follow up, the patient has also been instructed to follow up on as needed basis.     Future Appointments   Date Time Provider Department Center   7/17/2024 10:30 AM Rory Tovar MD [facility] "   7/30/2024  9:30 AM Franchesca Schultz PA [facility]   9/9/2024  3:15 PM Rory Tovar MD [facility]        Amos Tovar Jr, MD

## 2024-07-16 NOTE — ASSESSMENT & PLAN NOTE
She tolerated the biopsy well today.  We will see her back in 2 weeks to go over the results and make a plan going forward

## 2024-07-17 PROCEDURE — 87086 URINE CULTURE/COLONY COUNT: CPT | Performed by: STUDENT IN AN ORGANIZED HEALTH CARE EDUCATION/TRAINING PROGRAM

## 2024-07-30 ENCOUNTER — OFFICE VISIT (OUTPATIENT)
Dept: SURGICAL ONCOLOGY | Facility: CLINIC | Age: 34
End: 2024-07-30
Payer: COMMERCIAL

## 2024-07-30 VITALS
DIASTOLIC BLOOD PRESSURE: 90 MMHG | SYSTOLIC BLOOD PRESSURE: 141 MMHG | WEIGHT: 164 LBS | HEIGHT: 65 IN | BODY MASS INDEX: 27.32 KG/M2

## 2024-07-30 DIAGNOSIS — E04.1 THYROID NODULE: Primary | ICD-10-CM

## 2024-07-30 PROCEDURE — 3080F DIAST BP >= 90 MM HG: CPT | Mod: CPTII,S$GLB,,

## 2024-07-30 PROCEDURE — 1159F MED LIST DOCD IN RCRD: CPT | Mod: CPTII,S$GLB,,

## 2024-07-30 PROCEDURE — 3077F SYST BP >= 140 MM HG: CPT | Mod: CPTII,S$GLB,,

## 2024-07-30 PROCEDURE — 99999 PR PBB SHADOW E&M-EST. PATIENT-LVL IV: CPT | Mod: PBBFAC,,,

## 2024-07-30 PROCEDURE — 99213 OFFICE O/P EST LOW 20 MIN: CPT | Mod: S$GLB,,,

## 2024-07-30 PROCEDURE — 3044F HG A1C LEVEL LT 7.0%: CPT | Mod: CPTII,S$GLB,,

## 2024-07-30 PROCEDURE — 3008F BODY MASS INDEX DOCD: CPT | Mod: CPTII,S$GLB,,

## 2024-07-30 NOTE — PROGRESS NOTES
[facility]  Head and Neck Surgical Oncology Clinic     Patient ID: [id number]     Chief Complaint: Follow-up (2 wk f/u Thyroid Nodule/)      HPI:     Magaly Diaz is a 33 y.o. female here today for follow up.  She had an FNA of a 2.6 cm left thyroid nodule 2 weeks ago.  She is here today to review her results and discuss plan going forward.    History reviewed. No pertinent past medical history.     Past Surgical History:   Procedure Laterality Date    ACDF       c4-c6    DILATION, EUSTACHIAN TUBE, BILATERAL, USING BALLOON      ESOPHAGOGASTRODUODENOSCOPY          Social History     Tobacco Use    Smoking status: Former     Current packs/day: 0.50     Types: Cigarettes    Smokeless tobacco: Never   Substance and Sexual Activity    Alcohol use: Not on file    Drug use: Not on file    Sexual activity: Not on file        Current Outpatient Medications   Medication Instructions    desvenlafaxine succinate (PRISTIQ) 50 mg, Oral, Daily, NOT SURE THE DOSAGE.    dextroamphetamine-amphetamine (ADDERALL XR) 25 MG 24 hr capsule 25 mg, Oral, Every morning    dextroamphetamine-amphetamine (ADDERALL) 10 mg Tab 10 mg, Oral, Daily PRN    etonogestreL-ethinyl estradioL (NUVARING) 0.12-0.015 mg/24 hr vaginal ring 1 each, Vaginal, Every 30 days    ketoconazole (NIZORAL) 1 g, Topical (Top), Every other day    LORazepam (ATIVAN) 1 mg, Oral, Every 8 hours PRN    methocarbamoL (ROBAXIN) 750 MG Tab 1 tablet, Oral, 3 times daily    pregabalin (LYRICA) 150 MG capsule        Review of patient's allergies indicates:  No Known Allergies     Patient Care Team:  Rory Tovar MD as PCP - General (Family Medicine)  Luis De La Cruz MD as Consulting Physician (Obstetrics and Gynecology)  Chepe Devine Jr., MD as Consulting Physician (Physical Medicine and Rehabilitation)     Subjective:     Review of Systems    12 point review of systems conducted, negative except as stated in the history of present illness. See HPI  "for details.    Objective:     Visit Vitals  BP (!) 141/90   Pulse (P) 106   Ht 5' 5" (1.651 m)   Wt 74.4 kg (164 lb)   LMP 06/20/2024 (Approximate)   BMI 27.29 kg/m²       ENT Physical Exam     General: AOx3, NAD  Cardiac: Well perfused  Respiratory: Breathing without stridor or distress  Right Ear: External Auditory Canal WNL,TM w/o masses/lesions/perforations  Left Ear:  External Auditory Canal WNL,TM w/o masses/lesions/perforations  Nose: No gross nasal septal deviation.  Inferior Turbinates WNL bilaterally.  No septal perforation.  No masses/lesions.  Oral Cavity: FOM Soft, no masses palpated.  Oral Tongue mobile.  Hard Palate WNL.  Oropharynx: BOT WNL.  No masses/lesions noted.  Tonsillar fossa without lesions.  Soft palate without masses.  Midline uvula.  Neck: No palpable lymphadenopathy at I - VI.    Face: House Brackmann I bilaterally.  Eyes: Normal extra ocular motion bilaterally.        Diagnostic Imaging/pathology:     All pertinent head and neck imaging independently reviewed. Discussed these findings in detail with the patient.    Assessment:       ICD-10-CM ICD-9-CM   1. Thyroid nodule  E04.1 241.0        Plan:     1. Thyroid nodule  Overview:  1. Left lower 2.6 cm thyroid nodule meeting criteria for fine-needle aspiration on 07/16/2024    S/p FNA by Dr. Amos Tovar on 7/16/2024    Assessment & Plan:  -Afirma results reviewed today which were benign.  We discussed plan going forward being a repeat ultrasound in 1 year.  She will let us know sooner if she has any changes.    Orders:  -     US Soft Tissue Head Neck; Future; Expected date: 07/30/2024         No follow-ups on file. In addition to their scheduled follow up, the patient has also been instructed to follow up on as needed basis.     Future Appointments   Date Time Provider Department Center   9/9/2024  3:15 PM Rory Tovar MD [facility]   7/29/2025 10:15 AM Amos Tovar Jr., MD [address]        Franchesca Schultz, " PA

## 2024-07-30 NOTE — ASSESSMENT & PLAN NOTE
-Afirma results reviewed today which were benign.  We discussed plan going forward being a repeat ultrasound in 1 year.  She will let us know sooner if she has any changes.

## 2024-10-21 PROBLEM — Z00.00 WELLNESS EXAMINATION: Status: RESOLVED | Noted: 2023-07-13 | Resolved: 2024-10-21

## 2024-11-21 PROBLEM — F32.2 MAJOR DEPRESSIVE DISORDER, SINGLE EPISODE, SEVERE: Status: ACTIVE | Noted: 2024-11-21

## 2024-12-11 ENCOUNTER — TELEPHONE (OUTPATIENT)
Dept: SURGICAL ONCOLOGY | Facility: CLINIC | Age: 34
End: 2024-12-11
Payer: COMMERCIAL